# Patient Record
(demographics unavailable — no encounter records)

---

## 2024-11-04 NOTE — HISTORY OF PRESENT ILLNESS
[TextBox_4] : 66-year-old female with past medical history of hypertension, hyperlipidemia, bronchiectasis with known AKIL diagnosis (via bronchoscopy done in 2014) here for post-hospital discharge appointment for hemoptysis. Patient was at [facility] from 3/24/24 to 3/27/24 for hemoptysis that had been ongoing for a few days prior to admission. She had no other pulmonary symptoms then including shortness of breath, wheezing, chest pain.  This was now the third episode of hemoptysis that she has had likely in relation to her AKIL, with the first one in 2014. This was what led to the diagnosis of AKIL by via bronchoscopy.  She had a CAT scan done then and in discussion with her prior pulmonologist, decided to hold treatment as her hemoptysis spontaneously resolved.  Around [id number] she had a similar incident where she coughed up an approximate 50 cc of blood, and was admitted to [facility].  Chest x-ray at that time suggested pneumonia and she was sent home on antibiotics.  Hemoptysis again resolved then.  Hemoptysis persisted after initially presenting to the hospital, ultimately leading to a bronchoscopy.  Bronchoscopy showed no areas of active bleeding, but evidence of bleeding from her lingula.  BAL and wash cultures, as well as sputum culture showed evidence of AKIL. More recently, she had another episode of hemoptysis and went to urgent care. She was diagnosed with bronchitis and is completing a course of azithromycin. Still without any other respiratory symptoms though.   5/14/24 Patient presents for follow-up after initiating AKIL antimicrobial therapy.  She has no further episodes of coughing, hemoptysis, wheezing and respiratory symptoms are all resolved.  She does have some gastrointestinal side effects from the antimicrobials including diarrhea, nausea, abdominal cramping but these are tolerable and only happen occasionally.  6/19/14 Patient presents for follow up. Nausea and dizziness has been worse over the last few weeks. She is unclear whether this happens consistently on days that she takes her AKIL meds, and at times happens in the middle of the night randomly. No respiratory symptoms including coughing, dyspnea, hemoptysis.   6/21/24 Discussed recent lab test with patient. Study shows mild elevation of transaminases with ALT being 95 and AST being 61. Renal function still remains within normal limits. Her dizziness is gone but GI symptoms are persistent. Given elevation of transaminase and GI symptoms patient would like to try rifabutin to replace rifampin, which I agree with. Prescription has been put in and patient will call me if there are any significant side effects to the rifabutin.  7/25/24 Patient presents for follow-up.  After initiation of PPI and switching rifampin to rifabutin, gastrointestinal symptoms have significantly improved.  She has no symptoms currently and is tolerating therapy well.  11/4/24 Patient presents for follow-up overall feeling well and has no complaints.  Still compliant with the medications for AKIL treatment.     [Awakes Unrefreshed] : awakes unrefreshed [Daytime Somnolence] : daytime somnolence [Difficulty Initiating Sleep] : does not have difficulty initiating sleep [Difficulty Maintaining Sleep] : difficulty maintaining sleep [Fatigue] : fatigue [Nonrestorative Sleep] : nonrestorative sleep [Recent  Weight Gain] : no recent weight gain [Tired while Driving] : not tired while driving [Unusual Movements] : no unusual movements [Witnessed Apneas] : no witnessed apneas [ESS] : 0

## 2024-11-04 NOTE — ASSESSMENT
[FreeTextEntry1] : 66-year-old female with above history including bronchiectasis and known AKIL with recurrent hemoptysis, presenting for follow up after initiation of AKIL treatment.    > AKIL > Bronchiectasis  - Continues to tolerate therapy well with azithromycin, rifabutin, ethambutol 3 times weekly.  PPI to continue. CMP and CBC to be performed today.  If stable, will space out lab test to every other month as she has had no significant elevations in her LFTs or renal function. No further episodes of hemotpysis and/or respiratory issues after AKIL tx initiation. - Based on prior CAT scan findings, which showed bronchiectatic changes bilaterally with adjacent airspace consolidation, no distinct cavitations and with presence of nodular bronchiectasis, treatment to continue for non that cavitary nodular bronchiectatic form of AKIL. Repeat CT now. - Optho exam q6 months or sooner if vision changes. - HST performed prior reviewed with patient - normal AHI and no LIANET. - Plans to get PCV 23. Offered today and declined.   Will call for lab results. f/u in 3-4 months in office

## 2024-11-04 NOTE — PHYSICAL EXAM
[No Acute Distress] : no acute distress [Normal Oropharynx] : normal oropharynx [Normal Appearance] : normal appearance [No Neck Mass] : no neck mass [Normal Rate/Rhythm] : normal rate/rhythm [Normal S1, S2] : normal s1, s2 [No Murmurs] : no murmurs [No Resp Distress] : no resp distress [Clear to Auscultation Bilaterally] : clear to auscultation bilaterally [No Abnormalities] : no abnormalities [Benign] : benign [Normal Gait] : normal gait [No Clubbing] : no clubbing [No Cyanosis] : no cyanosis [No Edema] : no edema [FROM] : FROM [Normal Color/ Pigmentation] : normal color/ pigmentation [No Focal Deficits] : no focal deficits [Oriented x3] : oriented x3 [Normal Affect] : normal affect [TextBox_11] : no jaundice.

## 2025-01-03 NOTE — HISTORY OF PRESENT ILLNESS
[TextBox_4] : 66-year-old female with past medical history of hypertension, hyperlipidemia, bronchiectasis with known AKIL diagnosis (via bronchoscopy done in 2014) here for post-hospital discharge appointment for hemoptysis. Patient was at [facility] from 3/24/24 to 3/27/24 for hemoptysis that had been ongoing for a few days prior to admission. She had no other pulmonary symptoms then including shortness of breath, wheezing, chest pain.  This was now the third episode of hemoptysis that she has had likely in relation to her AKIL, with the first one in 2014. This was what led to the diagnosis of AKIL by via bronchoscopy.  She had a CAT scan done then and in discussion with her prior pulmonologist, decided to hold treatment as her hemoptysis spontaneously resolved.  Around [id number] she had a similar incident where she coughed up an approximate 50 cc of blood, and was admitted to [facility].  Chest x-ray at that time suggested pneumonia and she was sent home on antibiotics.  Hemoptysis again resolved then.  Hemoptysis persisted after initially presenting to the hospital, ultimately leading to a bronchoscopy.  Bronchoscopy showed no areas of active bleeding, but evidence of bleeding from her lingula.  BAL and wash cultures, as well as sputum culture showed evidence of AKIL. More recently, she had another episode of hemoptysis and went to urgent care. She was diagnosed with bronchitis and is completing a course of azithromycin. Still without any other respiratory symptoms though.   5/14/24 Patient presents for follow-up after initiating AKIL antimicrobial therapy.  She has no further episodes of coughing, hemoptysis, wheezing and respiratory symptoms are all resolved.  She does have some gastrointestinal side effects from the antimicrobials including diarrhea, nausea, abdominal cramping but these are tolerable and only happen occasionally.  6/19/14 Patient presents for follow up. Nausea and dizziness has been worse over the last few weeks. She is unclear whether this happens consistently on days that she takes her AKIL meds, and at times happens in the middle of the night randomly. No respiratory symptoms including coughing, dyspnea, hemoptysis.   6/21/24 Discussed recent lab test with patient. Study shows mild elevation of transaminases with ALT being 95 and AST being 61. Renal function still remains within normal limits. Her dizziness is gone but GI symptoms are persistent. Given elevation of transaminase and GI symptoms patient would like to try rifabutin to replace rifampin, which I agree with. Prescription has been put in and patient will call me if there are any significant side effects to the rifabutin.  7/25/24 Patient presents for follow-up.  After initiation of PPI and switching rifampin to rifabutin, gastrointestinal symptoms have significantly improved.  She has no symptoms currently and is tolerating therapy well.  11/4/24 Patient presents for follow-up overall feeling well and has no complaints.  Still compliant with the medications for AKIL treatment.    1/3/24 Patient presents for acute visit as she has been having some respiratory issues for the past week or so.  A few days ago she had fevers with cough followed by a single episode of hemoptysis yesterday which as improved as of this morning. [Awakes Unrefreshed] : awakes unrefreshed [Daytime Somnolence] : daytime somnolence [Difficulty Initiating Sleep] : does not have difficulty initiating sleep [Difficulty Maintaining Sleep] : difficulty maintaining sleep [Fatigue] : fatigue [Nonrestorative Sleep] : nonrestorative sleep [Recent  Weight Gain] : no recent weight gain [Tired while Driving] : not tired while driving [Unusual Movements] : no unusual movements [Witnessed Apneas] : no witnessed apneas [ESS] : 0

## 2025-01-03 NOTE — ASSESSMENT
Patient's mom/Roselyn informed of results and recommendations. She verbalized understanding.   [FreeTextEntry1] : 66-year-old female with above history including bronchiectasis and known AKIL with recurrent hemoptysis, presenting for follow up for acute URI with hemoptysis  > AKIL > Bronchiectasis > Hemoptysis  - Will obtain labs, sputum culture including AFB - low suspicion worsening AKIL given last CT scan findings with some improvements and acuity of symptoms. Likely bronchitis with hemoptysis 2/2 bronchiectasis, which seems to be improving - CXR in office with no acute infiltrates. RVP (RSV, COVID, FLU negative) - Symptomatically treat and will observe for now. Will call as soon as labs and sputum results are available.  - Optho exam q6 months or sooner if vision changes. - HST performed prior reviewed with patient - normal AHI and no LIANET

## 2025-02-04 NOTE — ASSESSMENT
[FreeTextEntry1] : 66-year-old female with above history including bronchiectasis and known AKIL with recurrent hemoptysis, presenting for follow up  > AKIL > Bronchiectasis  - Hemoptysis continues to be resolved after Serratia was treated.  This was likely related to bronchitis from Serratia leading to hemoptysis and not worsening AKIL especially given her CT scan from a few months ago and overall improvement of symptoms after most recent antibiotic course. - She does have muscle aches and vague symptoms (although she has a chronically and just had a higher frequency over the last few weeks).  Will repeat labs including CMP and CBC.  If normal, no longer will need to do labs unless clinically there is some change warranting it.  Her sputum culture was negative for AKIL on prior visit a month ago across 2 samples and will likely stop therapy on 4/11/2025. - CT scan repeat on 3/2025 shortly before her follow-up appointment on 4/7 - 4/9.  Will ascertain whether to continue or stop AKIL treatment at that point. - Optho exam q6 months or sooner if vision changes. - HST performed prior reviewed with patient - normal AHI and no LIANET  Will call for lab results but otherwise follow-up in 4/2025 as mentioned above.  Sooner if needed

## 2025-02-04 NOTE — HISTORY OF PRESENT ILLNESS
[TextBox_4] : 66-year-old female with past medical history of hypertension, hyperlipidemia, bronchiectasis with known AKIL diagnosis (via bronchoscopy done in 2014) here for post-hospital discharge appointment for hemoptysis. Patient was at [facility] from 3/24/24 to 3/27/24 for hemoptysis that had been ongoing for a few days prior to admission. She had no other pulmonary symptoms then including shortness of breath, wheezing, chest pain.  This was now the third episode of hemoptysis that she has had likely in relation to her AKIL, with the first one in 2014. This was what led to the diagnosis of AKIL by via bronchoscopy.  She had a CAT scan done then and in discussion with her prior pulmonologist, decided to hold treatment as her hemoptysis spontaneously resolved.  Around [id number] she had a similar incident where she coughed up an approximate 50 cc of blood, and was admitted to [facility].  Chest x-ray at that time suggested pneumonia and she was sent home on antibiotics.  Hemoptysis again resolved then.  Hemoptysis persisted after initially presenting to the hospital, ultimately leading to a bronchoscopy.  Bronchoscopy showed no areas of active bleeding, but evidence of bleeding from her lingula.  BAL and wash cultures, as well as sputum culture showed evidence of AKIL. More recently, she had another episode of hemoptysis and went to urgent care. She was diagnosed with bronchitis and is completing a course of azithromycin. Still without any other respiratory symptoms though.   5/14/24 Patient presents for follow-up after initiating AKIL antimicrobial therapy.  She has no further episodes of coughing, hemoptysis, wheezing and respiratory symptoms are all resolved.  She does have some gastrointestinal side effects from the antimicrobials including diarrhea, nausea, abdominal cramping but these are tolerable and only happen occasionally.  6/19/14 Patient presents for follow up. Nausea and dizziness has been worse over the last few weeks. She is unclear whether this happens consistently on days that she takes her AKIL meds, and at times happens in the middle of the night randomly. No respiratory symptoms including coughing, dyspnea, hemoptysis.   6/21/24 Discussed recent lab test with patient. Study shows mild elevation of transaminases with ALT being 95 and AST being 61. Renal function still remains within normal limits. Her dizziness is gone but GI symptoms are persistent. Given elevation of transaminase and GI symptoms patient would like to try rifabutin to replace rifampin, which I agree with. Prescription has been put in and patient will call me if there are any significant side effects to the rifabutin.  7/25/24 Patient presents for follow-up.  After initiation of PPI and switching rifampin to rifabutin, gastrointestinal symptoms have significantly improved.  She has no symptoms currently and is tolerating therapy well.  11/4/24 Patient presents for follow-up overall feeling well and has no complaints.  Still compliant with the medications for AKIL treatment.    1/3/24 Patient presents for acute visit as she has been having some respiratory issues for the past week or so.  A few days ago she had fevers with cough followed by a single episode of hemoptysis yesterday which as improved as of this morning.  2/4/25 Patient presents for follow-up.  Overall feels okay but has been having some bodyaches over the last few weeks at higher frequency than her usual.  No other systemic symptoms.  Hemoptysis and sputum production continue to be resolved after last treatment of Serratia and ongoing treatment for AKIL [Awakes Unrefreshed] : awakes unrefreshed [Daytime Somnolence] : daytime somnolence [Difficulty Initiating Sleep] : does not have difficulty initiating sleep [Difficulty Maintaining Sleep] : difficulty maintaining sleep [Fatigue] : fatigue [Nonrestorative Sleep] : nonrestorative sleep [Recent  Weight Gain] : no recent weight gain [Tired while Driving] : not tired while driving [Unusual Movements] : no unusual movements [Witnessed Apneas] : no witnessed apneas [ESS] : 0

## 2025-04-10 NOTE — HISTORY OF PRESENT ILLNESS
[TextBox_4] : 66-year-old female with past medical history of hypertension, hyperlipidemia, bronchiectasis with known AKIL diagnosis (via bronchoscopy done in 2014) here for post-hospital discharge appointment for hemoptysis. Patient was at [facility] from 3/24/24 to 3/27/24 for hemoptysis that had been ongoing for a few days prior to admission. She had no other pulmonary symptoms then including shortness of breath, wheezing, chest pain.  This was now the third episode of hemoptysis that she has had likely in relation to her AKIL, with the first one in 2014. This was what led to the diagnosis of AKIL by via bronchoscopy.  She had a CAT scan done then and in discussion with her prior pulmonologist, decided to hold treatment as her hemoptysis spontaneously resolved.  Around [id number] she had a similar incident where she coughed up an approximate 50 cc of blood, and was admitted to [facility].  Chest x-ray at that time suggested pneumonia and she was sent home on antibiotics.  Hemoptysis again resolved then.  Hemoptysis persisted after initially presenting to the hospital, ultimately leading to a bronchoscopy.  Bronchoscopy showed no areas of active bleeding, but evidence of bleeding from her lingula.  BAL and wash cultures, as well as sputum culture showed evidence of AKIL. More recently, she had another episode of hemoptysis and went to urgent care. She was diagnosed with bronchitis and is completing a course of azithromycin. Still without any other respiratory symptoms though.   5/14/24 Patient presents for follow-up after initiating AKIL antimicrobial therapy.  She has no further episodes of coughing, hemoptysis, wheezing and respiratory symptoms are all resolved.  She does have some gastrointestinal side effects from the antimicrobials including diarrhea, nausea, abdominal cramping but these are tolerable and only happen occasionally.  6/19/14 Patient presents for follow up. Nausea and dizziness has been worse over the last few weeks. She is unclear whether this happens consistently on days that she takes her AKIL meds, and at times happens in the middle of the night randomly. No respiratory symptoms including coughing, dyspnea, hemoptysis.   6/21/24 Discussed recent lab test with patient. Study shows mild elevation of transaminases with ALT being 95 and AST being 61. Renal function still remains within normal limits. Her dizziness is gone but GI symptoms are persistent. Given elevation of transaminase and GI symptoms patient would like to try rifabutin to replace rifampin, which I agree with. Prescription has been put in and patient will call me if there are any significant side effects to the rifabutin.  7/25/24 Patient presents for follow-up.  After initiation of PPI and switching rifampin to rifabutin, gastrointestinal symptoms have significantly improved.  She has no symptoms currently and is tolerating therapy well.  11/4/24 Patient presents for follow-up overall feeling well and has no complaints.  Still compliant with the medications for AKIL treatment.    1/3/24 Patient presents for acute visit as she has been having some respiratory issues for the past week or so.  A few days ago she had fevers with cough followed by a single episode of hemoptysis yesterday which as improved as of this morning.  2/4/25 Patient presents for follow-up.  Overall feels okay but has been having some bodyaches over the last few weeks at higher frequency than her usual.  No other systemic symptoms.  Hemoptysis and sputum production continue to be resolved after last treatment of Serratia and ongoing treatment for AKIL  4/10/25 Patient presents for follow-up.  Feels well and no respiratory complaints.  Occasionally still gets joint pains but is quickly resolved with the day of Tylenol use [Awakes Unrefreshed] : awakes unrefreshed [Daytime Somnolence] : daytime somnolence [Difficulty Initiating Sleep] : does not have difficulty initiating sleep [Difficulty Maintaining Sleep] : difficulty maintaining sleep [Fatigue] : fatigue [Nonrestorative Sleep] : nonrestorative sleep [Recent  Weight Gain] : no recent weight gain [Tired while Driving] : not tired while driving [Unusual Movements] : no unusual movements [Witnessed Apneas] : no witnessed apneas [ESS] : 0

## 2025-04-10 NOTE — ASSESSMENT
[FreeTextEntry1] : 66-year-old female with above history including bronchiectasis and known AKIL with recurrent hemoptysis, presenting for follow up  > AKIL > Bronchiectasis  - Sputum culture from 1/2025 without any evidence of AFB or bacterial culture. CT scan performed on 3/20/2024 shows stable exam with only a few persistent clusters of small nodules, which likely is related to her AKIL.  She also has a heterogeneous multilobular thyroid with a dominant 1.6 cm right thyroid nodule.  I have recommended her to follow-up with her primary care doctor regarding further imaging and workup. - In conversation with the patient, she has been on AKIL treatment since 4/2024.  Tentative plan is to stop treatment in 2 months as sputum cultures and CT scan have both shown improvement of her AKIL and no further episodes of hemoptysis aside from the isolated Serratia pneumonia that she had few months ago.  She will follow-up with me in 2 months or so.  Sooner if needed

## 2025-06-12 NOTE — HISTORY OF PRESENT ILLNESS
[Awakes Unrefreshed] : awakes unrefreshed [Daytime Somnolence] : daytime somnolence [Difficulty Maintaining Sleep] : difficulty maintaining sleep [Fatigue] : fatigue [Nonrestorative Sleep] : nonrestorative sleep [TextBox_4] : Initially presenting to the office for a posthospital discharge appointment for hemoptysis where a bronchoscopy was performed (BAL) diagnosing her with AKIL.  She was initiated on triple medication therapy (3 times weekly as disease was relatively mild with no large cavitating lesions).  CT scan at the time showed significant bronchiectasis with fibrotic changes in both the lingula and the right middle lobe. Coughing, hemoptysis, and wheezing had resolved after starting AKIL therapy and she tolerated it for the most part aside from mild GI side effects and slight worsening of her baseline leukopenia.  Rifampin was switched to rifabutin for gastrointestinal symptoms which made it better.  On 1/2025 she had an episode of Serratia pneumonia which was treated with cefdinir at the time and she recovered well.  I have been following her since then for serial lab exams on her triple therapy.  5/30/25 Patient called for an acute TTM visit. She had fevers with increased cough yesterday, with an episode of hemoptysis about 3 AM in the morning estimated about 5 cc. She has had 2 episodes since then with the latest 1 still being bright red blood admixed with small blood clots. No shortness of breath or any other non-respiratory symptoms. In January 2025, I treated her with cefdinir for Serratia pneumonia induced hemoptysis (sputum cultures obtained then), which was a single episode that self resolved. In conversation with the patient, given the more severe symptoms at this time and increased frequency of hemoptysis, suggested her to be rapidly assessed in the ED including potential CT imaging. She was agreeable and ED/pulmonary at [facility] has been notified. Will follow-up in office soon but contingent on date of discharge.  6/12/25 Patient presents for follow-up.  She was admitted to the hospital on 5/30/2025 after my last phone visit with her mentioned above.  She was treated for community-acquired pneumonia with antibiotics for 5 days and upon discussion with pulmonary service during that time, triple therapy for AKIL was discontinued which I agreed with.  Since then she has been feeling well [Difficulty Initiating Sleep] : does not have difficulty initiating sleep [Recent  Weight Gain] : no recent weight gain [Tired while Driving] : not tired while driving [Unusual Movements] : no unusual movements [Witnessed Apneas] : no witnessed apneas [ESS] : 0

## 2025-06-12 NOTE — ASSESSMENT
[FreeTextEntry1] : 66-year-old female with above history including bronchiectasis and known AKIL s/p triple therapy tx for 13 months, with recurrent hemoptysis, presenting for follow up  > AKIL > Bronchiectasis > PNA  - Sputum culture done in hospital persistently showed no evidence of AFB and agree with holding AKIL treatment. CT scan performed in hospital (5/30/2025) was personally reviewed which showed left lower lobe groundglass opacities in addition to her chronic bronchiectatic changes.  Unclear whether this was pneumonia or aspirated blood but she is now better after CAP treatment. - She still coughing some mucus but improved since her hospitalization.  For preventative measure, I have asked her to do saline nebulizers in the morning, followed by a combination of postural drainage (on her back) with Aerobika.  Preferably twice a day and she will try to do as much as possible  f/u in 3 months. Will discuss repeat CT scan then to assure resolution of LLL findings, and repeat CBC to monitor leukopenia.  This note was partially created using a voice-recognition program.  Typographical errors may occur that escape my detection. Please contact me or hospital if a word or phrase does not appear to fit into the contextual sense